# Patient Record
Sex: FEMALE | Race: WHITE | NOT HISPANIC OR LATINO | URBAN - METROPOLITAN AREA
[De-identification: names, ages, dates, MRNs, and addresses within clinical notes are randomized per-mention and may not be internally consistent; named-entity substitution may affect disease eponyms.]

---

## 2024-10-22 ENCOUNTER — HOSPITAL ENCOUNTER (OUTPATIENT)
Facility: HOSPITAL | Age: 69
Discharge: HOME | End: 2024-10-22
Attending: EMERGENCY MEDICINE | Admitting: EMERGENCY MEDICINE
Payer: MEDICARE

## 2024-10-22 VITALS
DIASTOLIC BLOOD PRESSURE: 72 MMHG | BODY MASS INDEX: 23 KG/M2 | RESPIRATION RATE: 14 BRPM | HEIGHT: 62 IN | SYSTOLIC BLOOD PRESSURE: 160 MMHG | WEIGHT: 125 LBS | OXYGEN SATURATION: 100 % | HEART RATE: 72 BPM | TEMPERATURE: 98.3 F

## 2024-10-22 DIAGNOSIS — R09.81 NASAL CONGESTION: Primary | ICD-10-CM

## 2024-10-22 DIAGNOSIS — J06.9 UPPER RESPIRATORY TRACT INFECTION, UNSPECIFIED TYPE: ICD-10-CM

## 2024-10-22 PROCEDURE — G0463 HOSPITAL OUTPT CLINIC VISIT: HCPCS | Performed by: EMERGENCY MEDICINE

## 2024-10-22 RX ORDER — AZITHROMYCIN 250 MG/1
TABLET, FILM COATED ORAL
Qty: 6 TABLET | Refills: 0 | Status: SHIPPED | OUTPATIENT
Start: 2024-10-22

## 2024-10-22 NOTE — FSED PROVIDER NOTE
[facility]  eMERGENCY dEPARTMENT eNCOUnter      Pt Name: Dede Bojorquez  MRN: [id number]  Birthdate 1955  Date of evaluation: 10/22/2024  Provider: Rivera Felder MD    CHIEF COMPLAINT       Chief Complaint   Patient presents with    Nasal Congestion     Nasal congestion x 10 days,        Nurses Notes reviewed and I agree except as noted in the HPI.      HISTORY OF PRESENT ILLNESS       History provided by:  Patient   the patient     Dede Bojorquez is a 68 y.o. female who presents to the emergency department for 2 weeks of nasal congestion and URI symptoms.  She has tried taking Claritin, Xyzal and DayQuil.  Patient has not found any significant relief.  She is heading out of town in the next couple hours and is looking to try to get something to help with her congestion.  She finds that everything tends to be worse in the morning.  She has not had a fever.  She reports that she blows her nose constantly throughout the night.      REVIEW OF SYSTEMS       Review of Systems   Constitutional:  Negative for fever.   HENT:  Positive for congestion, postnasal drip, sinus pressure and sore throat. Negative for facial swelling and sinus pain.    Respiratory:  Negative for cough.          PAST MEDICAL HISTORY     History reviewed. No pertinent past medical history.      SURGICAL HISTORY        has no past surgical history on file.      CURRENT MEDICATIONS          Medication List        START taking these medications      azithromycin 250 MG tablet  Commonly known as: ZITHROMAX  Take 2 tablets the first day, then 1 tablet daily for 4 days.            CONTINUE taking these medications      alendronate 70 MG tablet  Commonly known as: FOSAMAX     aspirin 81 MG EC tablet     atorvastatin 10 MG tablet  Commonly known as: LIPITOR     levocetirizine 5 MG tablet  Commonly known as: XYZAL     * levothyroxine 25 MCG tablet  Commonly known as: SYNTHROID, LEVOTHROID     * Synthroid 50  "MCG tablet  Generic drug: levothyroxine     NON FORMULARY     nystatin-triamcinolone 575372-1.1 UNIT/GM-% ointment  Commonly known as: MYCOLOG     sulfamethoxazole-trimethoprim 800-160 MG per tablet  Commonly known as: Bactrim DS  Take 1 tablet by mouth 2 (Two) Times a Day.           * This list has 2 medication(s) that are the same as other medications prescribed for you. Read the directions carefully, and ask your doctor or other care provider to review them with you.                   Where to Get Your Medications        These medications were sent to [facility]/pharmacy #[id number] - [place], [place] - [address] [place] [address] - [phone]  - [phone]  [address] [place] [address], [facility] [address]      Phone: [phone]   azithromycin 250 MG tablet           ALLERGIES       is allergic to ciprofloxacin.      FAMILY HISTORY       has no family status information on file.    family history is not on file.    SOCIAL HISTORY        reports that she has never smoked. She has never used smokeless tobacco. She reports current alcohol use.    PHYSICAL EXAM       INITIAL VITALS:  height is 157.5 cm (62\") and weight is 56.7 kg (125 lb). Her oral temperature is 98.3 °F (36.8 °C). Her blood pressure is 160/72 and her pulse is 72. Her respiration is 14 and oxygen saturation is 100%.      Physical Exam  Vitals and nursing note reviewed.   Constitutional:       Appearance: Normal appearance.   HENT:      Head: Normocephalic and atraumatic.      Nose: No rhinorrhea.      Mouth/Throat:      Mouth: Mucous membranes are moist.      Pharynx: Oropharynx is clear. Posterior oropharyngeal erythema (Mild) present. No oropharyngeal exudate.   Skin:     General: Skin is warm and dry.      Capillary Refill: Capillary refill takes less than 2 seconds.   Neurological:      General: No focal deficit present.      Mental Status: She is alert.   Psychiatric:         Mood and Affect: Mood normal.           DIAGNOSTIC RESULTS     EKG: All EKG's are interpreted by the " "Emergency Department Physician who either signs or Co-signs this chart in the absence of a cardiologist.        RADIOLOGY: non-plain film images(s) such as CT, Ultrasound and MRI are read by the radiologist.  Plain radiographic images are visualized and preliminarily interpreted by the emergency physician unless otherwise stated below.      No orders to display            LABS:   Lab Results (last 24 hours)       ** No results found for the last 24 hours. **            EMERGENCY DEPARTMENT COURSE:   Vitals:    Vitals:    10/22/24 0717   BP: 160/72   BP Location: Right arm   Patient Position: Sitting   Pulse: 72   Resp: 14   Temp: 98.3 °F (36.8 °C)   TempSrc: Oral   SpO2: 100%   Weight: 56.7 kg (125 lb)   Height: 157.5 cm (62\")       Medical Decision Making  Patient presented to the ED for URI type symptoms.  She is supposed to catch a flight at 930 and is concerned that the sinus congestion is going to hurt due to changes.  She is also concerned that the symptoms have been ongoing with little to no relief over the past 2 weeks.  I we will go ahead and write her for a course of azithromycin since it has been 2 weeks.  I discussed using Bromfed for her further symptoms as well as Mucinex D.  Patient has elected to try the Mucinex D from over-the-counter.  I discussed with her how to get it.  After careful review of history, physical and supporting data, there is low suspicion for a life or limb threatening cause of patient's symptoms.  The patient's symptoms have improved from presentation and they appear clinically well.  Patient reexamined prior to discharge and they appear improved.  Patient has been encouraged to follow-up with his/her PCP and to return to the ED with any lack of improvement or worsening symptoms.  The patient's questions regarding the work-up and plan were invited and answered.  The patient states understanding and agreement with discharge planning.      Problems Addressed:  Nasal congestion: " complicated acute illness or injury  Upper respiratory tract infection, unspecified type: complicated acute illness or injury    Risk  Prescription drug management.         The patient was given the following medications:  Medications - No data to display         CRITICAL CARE:  none    CONSULTS:  none    PROCEDURES:  Procedures  None    DIFFERENTIAL DIAGNOSIS:     Differential diagnoses include, but not limited to upper respiratory infection, COVID, flu, allergic rhinitis      FINAL IMPRESSION      1. Nasal congestion    2. Upper respiratory tract infection, unspecified type          DISPOSITION/PLAN     PATIENT REFERRED TO:  PATIENT CONNECTION - [facility] [address]  [phone]  Call in 3 days  As needed      DISCHARGE MEDICATIONS:     Medication List        START taking these medications      azithromycin 250 MG tablet  Commonly known as: ZITHROMAX  Take 2 tablets the first day, then 1 tablet daily for 4 days.            CONTINUE taking these medications      alendronate 70 MG tablet  Commonly known as: FOSAMAX     aspirin 81 MG EC tablet     atorvastatin 10 MG tablet  Commonly known as: LIPITOR     levocetirizine 5 MG tablet  Commonly known as: XYZAL     * levothyroxine 25 MCG tablet  Commonly known as: SYNTHROID, LEVOTHROID     * Synthroid 50 MCG tablet  Generic drug: levothyroxine     NON FORMULARY     nystatin-triamcinolone 846036-2.1 UNIT/GM-% ointment  Commonly known as: MYCOLOG     sulfamethoxazole-trimethoprim 800-160 MG per tablet  Commonly known as: Bactrim DS  Take 1 tablet by mouth 2 (Two) Times a Day.           * This list has 2 medication(s) that are the same as other medications prescribed for you. Read the directions carefully, and ask your doctor or other care provider to review them with you.                   Where to Get Your Medications        These medications were sent to [facility]/pharmacy #[id number] - [place], [place] - [address] [facility] [phone] [facility] [phone] FX  [address]   [facility] [address]      Phone: [phone]   azithromycin 250 MG tablet         (Please note that portions of this note were completed with a voice recognition program.  Efforts were made to edit the dictations but occasionally words are mis-transcribed.)    Rivera Felder MD (electronically signed) Emergency Physician